# Patient Record
Sex: FEMALE | Race: WHITE | Employment: OTHER | URBAN - METROPOLITAN AREA
[De-identification: names, ages, dates, MRNs, and addresses within clinical notes are randomized per-mention and may not be internally consistent; named-entity substitution may affect disease eponyms.]

---

## 2017-01-01 ENCOUNTER — TELEPHONE (OUTPATIENT)
Dept: FAMILY MEDICINE CLINIC | Facility: CLINIC | Age: 82
End: 2017-01-01

## 2017-01-01 ENCOUNTER — APPOINTMENT (OUTPATIENT)
Dept: ULTRASOUND IMAGING | Facility: HOSPITAL | Age: 82
DRG: 175 | End: 2017-01-01
Attending: INTERNAL MEDICINE
Payer: MEDICARE

## 2017-01-01 ENCOUNTER — PRIOR ORIGINAL RECORDS (OUTPATIENT)
Dept: OTHER | Age: 82
End: 2017-01-01

## 2017-01-01 ENCOUNTER — OFFICE VISIT (OUTPATIENT)
Dept: FAMILY MEDICINE CLINIC | Facility: CLINIC | Age: 82
End: 2017-01-01

## 2017-01-01 ENCOUNTER — LAB ENCOUNTER (OUTPATIENT)
Dept: LAB | Age: 82
End: 2017-01-01
Attending: FAMILY MEDICINE
Payer: COMMERCIAL

## 2017-01-01 ENCOUNTER — APPOINTMENT (OUTPATIENT)
Dept: GENERAL RADIOLOGY | Facility: HOSPITAL | Age: 82
DRG: 175 | End: 2017-01-01
Attending: EMERGENCY MEDICINE
Payer: MEDICARE

## 2017-01-01 ENCOUNTER — HOSPITAL ENCOUNTER (OUTPATIENT)
Dept: GENERAL RADIOLOGY | Age: 82
Discharge: HOME | End: 2017-01-01
Attending: FAMILY MEDICINE
Payer: MEDICARE

## 2017-01-01 ENCOUNTER — APPOINTMENT (OUTPATIENT)
Dept: CV DIAGNOSTICS | Facility: HOSPITAL | Age: 82
DRG: 175 | End: 2017-01-01
Attending: INTERNAL MEDICINE
Payer: MEDICARE

## 2017-01-01 ENCOUNTER — LAB ENCOUNTER (OUTPATIENT)
Dept: LAB | Age: 82
End: 2017-01-01
Attending: FAMILY MEDICINE
Payer: MEDICARE

## 2017-01-01 ENCOUNTER — APPOINTMENT (OUTPATIENT)
Dept: GENERAL RADIOLOGY | Facility: HOSPITAL | Age: 82
DRG: 175 | End: 2017-01-01
Attending: INTERNAL MEDICINE
Payer: MEDICARE

## 2017-01-01 ENCOUNTER — APPOINTMENT (OUTPATIENT)
Dept: CT IMAGING | Facility: HOSPITAL | Age: 82
DRG: 175 | End: 2017-01-01
Attending: EMERGENCY MEDICINE
Payer: MEDICARE

## 2017-01-01 ENCOUNTER — PATIENT OUTREACH (OUTPATIENT)
Dept: CASE MANAGEMENT | Age: 82
End: 2017-01-01

## 2017-01-01 ENCOUNTER — APPOINTMENT (OUTPATIENT)
Dept: CT IMAGING | Facility: HOSPITAL | Age: 82
DRG: 175 | End: 2017-01-01
Attending: INTERNAL MEDICINE
Payer: MEDICARE

## 2017-01-01 ENCOUNTER — HOSPITAL ENCOUNTER (INPATIENT)
Facility: HOSPITAL | Age: 82
LOS: 6 days | Discharge: HOME | DRG: 175 | End: 2017-01-01
Attending: EMERGENCY MEDICINE | Admitting: INTERNAL MEDICINE
Payer: MEDICARE

## 2017-01-01 VITALS
HEART RATE: 64 BPM | HEIGHT: 67 IN | BODY MASS INDEX: 24.17 KG/M2 | TEMPERATURE: 97 F | RESPIRATION RATE: 20 BRPM | WEIGHT: 154 LBS | SYSTOLIC BLOOD PRESSURE: 118 MMHG | DIASTOLIC BLOOD PRESSURE: 68 MMHG

## 2017-01-01 VITALS
DIASTOLIC BLOOD PRESSURE: 84 MMHG | WEIGHT: 162.06 LBS | OXYGEN SATURATION: 95 % | HEIGHT: 68 IN | RESPIRATION RATE: 17 BRPM | SYSTOLIC BLOOD PRESSURE: 115 MMHG | HEART RATE: 120 BPM | BODY MASS INDEX: 24.56 KG/M2 | TEMPERATURE: 98 F

## 2017-01-01 VITALS
OXYGEN SATURATION: 97 % | RESPIRATION RATE: 16 BRPM | SYSTOLIC BLOOD PRESSURE: 98 MMHG | HEART RATE: 98 BPM | DIASTOLIC BLOOD PRESSURE: 64 MMHG

## 2017-01-01 VITALS — OXYGEN SATURATION: 95 % | HEART RATE: 84 BPM | DIASTOLIC BLOOD PRESSURE: 80 MMHG | SYSTOLIC BLOOD PRESSURE: 124 MMHG

## 2017-01-01 DIAGNOSIS — R06.82 TACHYPNEA: ICD-10-CM

## 2017-01-01 DIAGNOSIS — R06.02 SHORTNESS OF BREATH: ICD-10-CM

## 2017-01-01 DIAGNOSIS — I48.92 ATRIAL FIBRILLATION AND FLUTTER (HCC): ICD-10-CM

## 2017-01-01 DIAGNOSIS — N39.0 URINARY TRACT INFECTION, SITE UNSPECIFIED: Primary | ICD-10-CM

## 2017-01-01 DIAGNOSIS — N28.9 RENAL INSUFFICIENCY: ICD-10-CM

## 2017-01-01 DIAGNOSIS — G81.94 HEMIPARESIS, LEFT (HCC): ICD-10-CM

## 2017-01-01 DIAGNOSIS — I48.0 PAROXYSMAL ATRIAL FIBRILLATION (HCC): ICD-10-CM

## 2017-01-01 DIAGNOSIS — I26.99 OTHER PULMONARY EMBOLISM WITHOUT ACUTE COR PULMONALE, UNSPECIFIED CHRONICITY (HCC): ICD-10-CM

## 2017-01-01 DIAGNOSIS — N30.00 ACUTE CYSTITIS WITHOUT HEMATURIA: ICD-10-CM

## 2017-01-01 DIAGNOSIS — I26.99 OTHER ACUTE PULMONARY EMBOLISM WITHOUT ACUTE COR PULMONALE (HCC): Primary | ICD-10-CM

## 2017-01-01 DIAGNOSIS — Z91.81 AT RISK FOR FALLING: ICD-10-CM

## 2017-01-01 DIAGNOSIS — Z86.73 HISTORY OF CVA (CEREBROVASCULAR ACCIDENT): ICD-10-CM

## 2017-01-01 DIAGNOSIS — R47.01 EXPRESSIVE APHASIA: Primary | ICD-10-CM

## 2017-01-01 DIAGNOSIS — R45.1 AGITATION: ICD-10-CM

## 2017-01-01 DIAGNOSIS — I10 ESSENTIAL HYPERTENSION: ICD-10-CM

## 2017-01-01 DIAGNOSIS — Z79.899 MEDICATION MANAGEMENT: ICD-10-CM

## 2017-01-01 DIAGNOSIS — N39.0 URINARY TRACT INFECTION, SITE UNSPECIFIED: ICD-10-CM

## 2017-01-01 DIAGNOSIS — I26.99 OTHER ACUTE PULMONARY EMBOLISM WITHOUT ACUTE COR PULMONALE (HCC): ICD-10-CM

## 2017-01-01 DIAGNOSIS — Z79.899 HIGH RISK MEDICATION USE: Primary | ICD-10-CM

## 2017-01-01 DIAGNOSIS — J90 PLEURAL EFFUSION: Primary | ICD-10-CM

## 2017-01-01 DIAGNOSIS — R05.9 COUGH: Primary | ICD-10-CM

## 2017-01-01 DIAGNOSIS — R47.01 EXPRESSIVE APHASIA: ICD-10-CM

## 2017-01-01 DIAGNOSIS — G93.40 ACUTE ENCEPHALOPATHY: Primary | ICD-10-CM

## 2017-01-01 DIAGNOSIS — F41.1 GAD (GENERALIZED ANXIETY DISORDER): ICD-10-CM

## 2017-01-01 DIAGNOSIS — R05.9 COUGH: ICD-10-CM

## 2017-01-01 DIAGNOSIS — J90 PLEURAL EFFUSION: ICD-10-CM

## 2017-01-01 DIAGNOSIS — I50.9 ACUTE HEART FAILURE, UNSPECIFIED HEART FAILURE TYPE (HCC): ICD-10-CM

## 2017-01-01 DIAGNOSIS — R60.0 BILATERAL LEG EDEMA: ICD-10-CM

## 2017-01-01 DIAGNOSIS — R77.8 ELEVATED TROPONIN: ICD-10-CM

## 2017-01-01 DIAGNOSIS — I48.91 ATRIAL FIBRILLATION AND FLUTTER (HCC): ICD-10-CM

## 2017-01-01 LAB
BILIRUB UR QL STRIP.AUTO: NEGATIVE
BILIRUB UR QL STRIP.AUTO: NEGATIVE
COLOR UR AUTO: YELLOW
COLOR UR AUTO: YELLOW
GLUCOSE UR STRIP.AUTO-MCNC: NEGATIVE MG/DL
GLUCOSE UR STRIP.AUTO-MCNC: NEGATIVE MG/DL
KETONES UR STRIP.AUTO-MCNC: NEGATIVE MG/DL
KETONES UR STRIP.AUTO-MCNC: NEGATIVE MG/DL
NITRITE UR QL STRIP.AUTO: POSITIVE
NITRITE UR QL STRIP.AUTO: POSITIVE
PH UR STRIP.AUTO: 5 [PH] (ref 4.5–8)
PH UR STRIP.AUTO: 6 [PH] (ref 4.5–8)
PROT UR STRIP.AUTO-MCNC: NEGATIVE MG/DL
PROT UR STRIP.AUTO-MCNC: NEGATIVE MG/DL
RBC UR QL AUTO: NEGATIVE
RBC UR QL AUTO: NEGATIVE
SP GR UR STRIP.AUTO: 1.01 (ref 1–1.03)
SP GR UR STRIP.AUTO: 1.01 (ref 1–1.03)
UROBILINOGEN UR STRIP.AUTO-MCNC: <2 MG/DL
UROBILINOGEN UR STRIP.AUTO-MCNC: <2 MG/DL

## 2017-01-01 PROCEDURE — 99233 SBSQ HOSP IP/OBS HIGH 50: CPT | Performed by: HOSPITALIST

## 2017-01-01 PROCEDURE — 99239 HOSP IP/OBS DSCHRG MGMT >30: CPT | Performed by: HOSPITALIST

## 2017-01-01 PROCEDURE — 99232 SBSQ HOSP IP/OBS MODERATE 35: CPT | Performed by: INTERNAL MEDICINE

## 2017-01-01 PROCEDURE — 81001 URINALYSIS AUTO W/SCOPE: CPT

## 2017-01-01 PROCEDURE — 71275 CT ANGIOGRAPHY CHEST: CPT

## 2017-01-01 PROCEDURE — 71010 XR CHEST AP PORTABLE  (CPT=71010): CPT

## 2017-01-01 PROCEDURE — 93306 TTE W/DOPPLER COMPLETE: CPT | Performed by: INTERNAL MEDICINE

## 2017-01-01 PROCEDURE — 99223 1ST HOSP IP/OBS HIGH 75: CPT | Performed by: INTERNAL MEDICINE

## 2017-01-01 PROCEDURE — 70498 CT ANGIOGRAPHY NECK: CPT | Performed by: RADIOLOGY

## 2017-01-01 PROCEDURE — 99221 1ST HOSP IP/OBS SF/LOW 40: CPT | Performed by: NURSE PRACTITIONER

## 2017-01-01 PROCEDURE — 71020 XR CHEST PA + LAT CHEST (CPT=71020): CPT

## 2017-01-01 PROCEDURE — 87077 CULTURE AEROBIC IDENTIFY: CPT

## 2017-01-01 PROCEDURE — 87186 SC STD MICRODIL/AGAR DIL: CPT

## 2017-01-01 PROCEDURE — 99214 OFFICE O/P EST MOD 30 MIN: CPT | Performed by: FAMILY MEDICINE

## 2017-01-01 PROCEDURE — 87086 URINE CULTURE/COLONY COUNT: CPT

## 2017-01-01 PROCEDURE — 95819 EEG AWAKE AND ASLEEP: CPT | Performed by: OTHER

## 2017-01-01 PROCEDURE — 99232 SBSQ HOSP IP/OBS MODERATE 35: CPT | Performed by: NURSE PRACTITIONER

## 2017-01-01 PROCEDURE — 99497 ADVNCD CARE PLAN 30 MIN: CPT | Performed by: NURSE PRACTITIONER

## 2017-01-01 PROCEDURE — 93970 EXTREMITY STUDY: CPT

## 2017-01-01 PROCEDURE — 70496 CT ANGIOGRAPHY HEAD: CPT | Performed by: RADIOLOGY

## 2017-01-01 PROCEDURE — 93306 TTE W/DOPPLER COMPLETE: CPT

## 2017-01-01 PROCEDURE — 99291 CRITICAL CARE FIRST HOUR: CPT | Performed by: OTHER

## 2017-01-01 RX ORDER — POTASSIUM CHLORIDE 20 MEQ/1
40 TABLET, EXTENDED RELEASE ORAL EVERY 4 HOURS
Status: COMPLETED | OUTPATIENT
Start: 2017-01-01 | End: 2017-01-01

## 2017-01-01 RX ORDER — FUROSEMIDE 10 MG/ML
40 INJECTION INTRAMUSCULAR; INTRAVENOUS
Status: DISCONTINUED | OUTPATIENT
Start: 2017-01-01 | End: 2017-01-01

## 2017-01-01 RX ORDER — FUROSEMIDE 40 MG/1
40 TABLET ORAL DAILY
Qty: 90 TABLET | Refills: 1 | Status: SHIPPED | OUTPATIENT
Start: 2017-01-01

## 2017-01-01 RX ORDER — SODIUM CHLORIDE 9 MG/ML
INJECTION, SOLUTION INTRAVENOUS CONTINUOUS
Status: DISCONTINUED | OUTPATIENT
Start: 2017-01-01 | End: 2017-01-01

## 2017-01-01 RX ORDER — POTASSIUM CHLORIDE 14.9 MG/ML
20 INJECTION INTRAVENOUS ONCE
Status: COMPLETED | OUTPATIENT
Start: 2017-01-01 | End: 2017-01-01

## 2017-01-01 RX ORDER — MIRTAZAPINE 15 MG/1
15 TABLET, FILM COATED ORAL NIGHTLY
COMMUNITY

## 2017-01-01 RX ORDER — HALOPERIDOL 5 MG/ML
1 INJECTION INTRAMUSCULAR EVERY 6 HOURS PRN
Status: DISCONTINUED | OUTPATIENT
Start: 2017-01-01 | End: 2017-01-01

## 2017-01-01 RX ORDER — LORAZEPAM 0.5 MG/1
TABLET ORAL
Qty: 30 TABLET | Refills: 0 | Status: SHIPPED | OUTPATIENT
Start: 2017-01-01 | End: 2017-01-01

## 2017-01-01 RX ORDER — ATORVASTATIN CALCIUM 40 MG/1
40 TABLET, FILM COATED ORAL NIGHTLY
COMMUNITY

## 2017-01-01 RX ORDER — LORAZEPAM 0.5 MG/1
0.5 TABLET ORAL 2 TIMES DAILY
COMMUNITY

## 2017-01-01 RX ORDER — LEVOTHYROXINE SODIUM 0.1 MG/1
100 TABLET ORAL
COMMUNITY

## 2017-01-01 RX ORDER — LORAZEPAM 0.5 MG/1
0.5 TABLET ORAL 2 TIMES DAILY
Status: DISCONTINUED | OUTPATIENT
Start: 2017-01-01 | End: 2017-01-01

## 2017-01-01 RX ORDER — FUROSEMIDE 10 MG/ML
40 INJECTION INTRAMUSCULAR; INTRAVENOUS 3 TIMES DAILY
Status: DISCONTINUED | OUTPATIENT
Start: 2017-01-01 | End: 2017-01-01

## 2017-01-01 RX ORDER — CEPHALEXIN 250 MG/1
250 CAPSULE ORAL 3 TIMES DAILY
Qty: 21 CAPSULE | Refills: 0 | Status: SHIPPED | OUTPATIENT
Start: 2017-01-01 | End: 2017-01-01 | Stop reason: ALTCHOICE

## 2017-01-01 RX ORDER — ASPIRIN 300 MG
300 SUPPOSITORY, RECTAL RECTAL DAILY
Status: DISCONTINUED | OUTPATIENT
Start: 2017-01-01 | End: 2017-01-01

## 2017-01-01 RX ORDER — LEVOTHYROXINE SODIUM 0.1 MG/1
TABLET ORAL
Qty: 7 TABLET | Refills: 0 | Status: SHIPPED | OUTPATIENT
Start: 2017-01-01 | End: 2017-01-01

## 2017-01-01 RX ORDER — SODIUM PHOSPHATE, DIBASIC AND SODIUM PHOSPHATE, MONOBASIC 7; 19 G/133ML; G/133ML
1 ENEMA RECTAL ONCE AS NEEDED
Status: ACTIVE | OUTPATIENT
Start: 2017-01-01 | End: 2017-01-01

## 2017-01-01 RX ORDER — OMEPRAZOLE 20 MG/1
CAPSULE, DELAYED RELEASE ORAL
Qty: 90 CAPSULE | Refills: 0 | Status: SHIPPED | OUTPATIENT
Start: 2017-01-01 | End: 2017-01-01

## 2017-01-01 RX ORDER — FLUOXETINE HYDROCHLORIDE 20 MG/1
CAPSULE ORAL
Qty: 90 CAPSULE | Refills: 1 | Status: SHIPPED | OUTPATIENT
Start: 2017-01-01 | End: 2017-01-01

## 2017-01-01 RX ORDER — LOSARTAN POTASSIUM 100 MG/1
100 TABLET ORAL DAILY
Status: DISCONTINUED | OUTPATIENT
Start: 2017-01-01 | End: 2017-01-01

## 2017-01-01 RX ORDER — FUROSEMIDE 10 MG/ML
40 INJECTION INTRAMUSCULAR; INTRAVENOUS DAILY
Status: COMPLETED | OUTPATIENT
Start: 2017-01-01 | End: 2017-01-01

## 2017-01-01 RX ORDER — HEPARIN SODIUM 5000 [USP'U]/ML
80 INJECTION INTRAVENOUS; SUBCUTANEOUS ONCE
Status: COMPLETED | OUTPATIENT
Start: 2017-01-01 | End: 2017-01-01

## 2017-01-01 RX ORDER — LORAZEPAM 0.5 MG/1
TABLET ORAL
Qty: 90 TABLET | Refills: 0 | OUTPATIENT
Start: 2017-01-01

## 2017-01-01 RX ORDER — WARFARIN SODIUM 2 MG/1
4 TABLET ORAL NIGHTLY
Status: DISCONTINUED | OUTPATIENT
Start: 2017-01-01 | End: 2017-01-01

## 2017-01-01 RX ORDER — METOPROLOL TARTRATE 5 MG/5ML
5 INJECTION INTRAVENOUS EVERY 6 HOURS PRN
Status: DISCONTINUED | OUTPATIENT
Start: 2017-01-01 | End: 2017-01-01

## 2017-01-01 RX ORDER — LEVOTHYROXINE SODIUM 0.1 MG/1
TABLET ORAL
Qty: 90 TABLET | Refills: 1 | Status: SHIPPED | OUTPATIENT
Start: 2017-01-01 | End: 2017-01-01

## 2017-01-01 RX ORDER — DILTIAZEM HYDROCHLORIDE 5 MG/ML
10 INJECTION INTRAVENOUS EVERY 2 HOUR PRN
Status: ACTIVE | OUTPATIENT
Start: 2017-01-01 | End: 2017-01-01

## 2017-01-01 RX ORDER — OLMESARTAN MEDOXOMIL AND HYDROCHLOROTHIAZIDE 40/25 40; 25 MG/1; MG/1
TABLET ORAL
Qty: 90 TABLET | Refills: 0 | OUTPATIENT
Start: 2017-01-01

## 2017-01-01 RX ORDER — FLUOXETINE HYDROCHLORIDE 20 MG/1
CAPSULE ORAL
Qty: 90 CAPSULE | Refills: 0 | Status: SHIPPED | OUTPATIENT
Start: 2017-01-01 | End: 2017-01-01

## 2017-01-01 RX ORDER — FUROSEMIDE 10 MG/ML
60 INJECTION INTRAMUSCULAR; INTRAVENOUS ONCE
Status: COMPLETED | OUTPATIENT
Start: 2017-01-01 | End: 2017-01-01

## 2017-01-01 RX ORDER — ASPIRIN 81 MG/1
81 TABLET ORAL DAILY
Qty: 30 TABLET | Refills: 0 | Status: SHIPPED | COMMUNITY
Start: 2017-01-01

## 2017-01-01 RX ORDER — LORAZEPAM 2 MG/ML
1 INJECTION INTRAMUSCULAR EVERY 10 MIN PRN
Status: DISCONTINUED | OUTPATIENT
Start: 2017-01-01 | End: 2017-01-01

## 2017-01-01 RX ORDER — ERGOCALCIFEROL 1.25 MG/1
CAPSULE ORAL
Qty: 12 CAPSULE | Refills: 0 | Status: SHIPPED | OUTPATIENT
Start: 2017-01-01

## 2017-01-01 RX ORDER — LORAZEPAM 2 MG/ML
2 INJECTION INTRAMUSCULAR ONCE
Status: DISCONTINUED | OUTPATIENT
Start: 2017-01-01 | End: 2017-01-01

## 2017-01-01 RX ORDER — LORAZEPAM 2 MG/ML
INJECTION INTRAMUSCULAR
Status: DISCONTINUED
Start: 2017-01-01 | End: 2017-01-01 | Stop reason: WASHOUT

## 2017-01-01 RX ORDER — DEXTROSE MONOHYDRATE 50 MG/ML
INJECTION, SOLUTION INTRAVENOUS CONTINUOUS
Status: DISCONTINUED | OUTPATIENT
Start: 2017-01-01 | End: 2017-01-01

## 2017-01-01 RX ORDER — LEVETIRACETAM 500 MG/1
500 TABLET ORAL 2 TIMES DAILY
Status: DISCONTINUED | OUTPATIENT
Start: 2017-01-01 | End: 2017-01-01

## 2017-01-01 RX ORDER — FUROSEMIDE 40 MG/1
40 TABLET ORAL DAILY
Status: DISCONTINUED | OUTPATIENT
Start: 2017-01-01 | End: 2017-01-01

## 2017-01-01 RX ORDER — ASPIRIN 325 MG
325 TABLET, DELAYED RELEASE (ENTERIC COATED) ORAL DAILY
Status: DISCONTINUED | OUTPATIENT
Start: 2017-01-01 | End: 2017-01-01

## 2017-01-01 RX ORDER — POTASSIUM CHLORIDE 20 MEQ/1
TABLET, EXTENDED RELEASE ORAL
Qty: 180 TABLET | Refills: 1 | Status: SHIPPED | OUTPATIENT
Start: 2017-01-01 | End: 2017-01-01

## 2017-01-01 RX ORDER — HEPARIN SODIUM AND DEXTROSE 10000; 5 [USP'U]/100ML; G/100ML
INJECTION INTRAVENOUS CONTINUOUS
Status: DISCONTINUED | OUTPATIENT
Start: 2017-01-01 | End: 2017-01-01

## 2017-01-01 RX ORDER — MIRTAZAPINE 15 MG/1
15 TABLET, FILM COATED ORAL NIGHTLY
Status: DISCONTINUED | OUTPATIENT
Start: 2017-01-01 | End: 2017-01-01

## 2017-01-01 RX ORDER — FLUOXETINE HYDROCHLORIDE 20 MG/1
20 CAPSULE ORAL DAILY
Status: DISCONTINUED | OUTPATIENT
Start: 2017-01-01 | End: 2017-01-01

## 2017-01-01 RX ORDER — OMEPRAZOLE 20 MG/1
CAPSULE, DELAYED RELEASE ORAL
Qty: 90 CAPSULE | Refills: 0 | Status: SHIPPED | OUTPATIENT
Start: 2017-01-01

## 2017-01-01 RX ORDER — ACETAMINOPHEN 325 MG/1
650 TABLET ORAL EVERY 6 HOURS PRN
Status: DISCONTINUED | OUTPATIENT
Start: 2017-01-01 | End: 2017-01-01

## 2017-01-01 RX ORDER — LEVOTHYROXINE SODIUM 0.1 MG/1
100 TABLET ORAL
Status: DISCONTINUED | OUTPATIENT
Start: 2017-01-01 | End: 2017-01-01

## 2017-01-01 RX ORDER — LEVETIRACETAM 500 MG/1
500 TABLET ORAL 2 TIMES DAILY
Qty: 60 TABLET | Refills: 0 | Status: SHIPPED | OUTPATIENT
Start: 2017-01-01

## 2017-01-01 RX ORDER — PANTOPRAZOLE SODIUM 20 MG/1
20 TABLET, DELAYED RELEASE ORAL
Status: DISCONTINUED | OUTPATIENT
Start: 2017-01-01 | End: 2017-01-01

## 2017-01-01 RX ORDER — DEXTROSE AND SODIUM CHLORIDE 5; .45 G/100ML; G/100ML
INJECTION, SOLUTION INTRAVENOUS CONTINUOUS
Status: DISCONTINUED | OUTPATIENT
Start: 2017-01-01 | End: 2017-01-01

## 2017-01-01 RX ORDER — ASPIRIN 325 MG
325 TABLET ORAL DAILY
Status: DISCONTINUED | OUTPATIENT
Start: 2017-01-01 | End: 2017-01-01

## 2017-01-01 RX ORDER — ERGOCALCIFEROL (VITAMIN D2) 1250 MCG
50000 CAPSULE ORAL WEEKLY
Qty: 12 CAPSULE | Refills: 0 | Status: ON HOLD | OUTPATIENT
Start: 2017-01-01 | End: 2017-01-01

## 2017-01-01 RX ORDER — WARFARIN SODIUM 3 MG/1
3 TABLET ORAL NIGHTLY
Qty: 30 TABLET | Refills: 3 | Status: SHIPPED | OUTPATIENT
Start: 2017-01-01

## 2017-01-01 RX ORDER — ATORVASTATIN CALCIUM 40 MG/1
40 TABLET, FILM COATED ORAL NIGHTLY
Status: DISCONTINUED | OUTPATIENT
Start: 2017-01-01 | End: 2017-01-01

## 2017-01-01 RX ORDER — LORAZEPAM 0.5 MG/1
0.5 TABLET ORAL 3 TIMES DAILY PRN
Qty: 90 TABLET | Refills: 0 | Status: SHIPPED | OUTPATIENT
Start: 2017-01-01 | End: 2017-01-01

## 2017-01-01 RX ORDER — FUROSEMIDE 40 MG/1
TABLET ORAL
COMMUNITY
Start: 2017-01-01 | End: 2017-01-01

## 2017-01-01 RX ORDER — FUROSEMIDE 40 MG/1
40 TABLET ORAL DAILY
COMMUNITY
End: 2017-01-01

## 2017-01-01 RX ORDER — NITROFURANTOIN 25; 75 MG/1; MG/1
100 CAPSULE ORAL 2 TIMES DAILY
Qty: 14 CAPSULE | Refills: 0 | Status: SHIPPED | OUTPATIENT
Start: 2017-01-01 | End: 2017-01-01

## 2017-01-01 RX ORDER — MIRTAZAPINE 15 MG/1
TABLET, FILM COATED ORAL
Qty: 7 TABLET | Refills: 0 | Status: SHIPPED | OUTPATIENT
Start: 2017-01-01 | End: 2017-01-01

## 2017-01-01 RX ORDER — DONEPEZIL HYDROCHLORIDE 10 MG/1
TABLET, FILM COATED ORAL
Qty: 90 TABLET | Refills: 1 | Status: SHIPPED | OUTPATIENT
Start: 2017-01-01 | End: 2017-01-01

## 2017-01-01 RX ORDER — MIRTAZAPINE 15 MG/1
TABLET, FILM COATED ORAL
Qty: 90 TABLET | Refills: 1 | Status: SHIPPED | OUTPATIENT
Start: 2017-01-01 | End: 2017-01-01

## 2017-01-01 RX ORDER — HEPARIN SODIUM AND DEXTROSE 10000; 5 [USP'U]/100ML; G/100ML
18 INJECTION INTRAVENOUS ONCE
Status: COMPLETED | OUTPATIENT
Start: 2017-01-01 | End: 2017-01-01

## 2017-01-01 RX ORDER — HYDRALAZINE HYDROCHLORIDE 20 MG/ML
10 INJECTION INTRAMUSCULAR; INTRAVENOUS EVERY 4 HOURS PRN
Status: DISCONTINUED | OUTPATIENT
Start: 2017-01-01 | End: 2017-01-01

## 2017-01-01 RX ORDER — ATORVASTATIN CALCIUM 40 MG/1
TABLET, FILM COATED ORAL
Qty: 90 TABLET | Refills: 1 | Status: SHIPPED | OUTPATIENT
Start: 2017-01-01 | End: 2017-01-01

## 2017-01-01 RX ORDER — ONDANSETRON 2 MG/ML
4 INJECTION INTRAMUSCULAR; INTRAVENOUS EVERY 6 HOURS PRN
Status: DISCONTINUED | OUTPATIENT
Start: 2017-01-01 | End: 2017-01-01

## 2017-01-01 RX ORDER — POLYETHYLENE GLYCOL 3350 17 G/17G
17 POWDER, FOR SOLUTION ORAL DAILY PRN
Status: DISCONTINUED | OUTPATIENT
Start: 2017-01-01 | End: 2017-01-01

## 2017-01-01 RX ORDER — FUROSEMIDE 40 MG/1
40 TABLET ORAL DAILY
Qty: 30 TABLET | Refills: 2 | Status: SHIPPED | OUTPATIENT
Start: 2017-01-01 | End: 2017-01-01

## 2017-01-03 NOTE — TELEPHONE ENCOUNTER
Amy Mata would like to extend ST two more visits. One more this , week and one next. She just needs a verbal ok. Please advise.

## 2017-01-03 NOTE — TELEPHONE ENCOUNTER
Call to [facility]idential home health/speech therapy-advised of dr burns's orders noted below. Samuel Mancera voices understanding/sts will update again if further visits indicated.

## 2017-02-02 NOTE — TELEPHONE ENCOUNTER
Received paper from [facility] stating that pt has V-Tach and they were checking if okay to continue taking Donepezil. Per Dr Sascha Freire called to Dr Layo Singh' office to be advised if okay.   Spoke with Geraldine Fair who took my informa

## 2017-02-03 NOTE — TELEPHONE ENCOUNTER
Spoke with Mitali Elder at Dr Alberto Carrero office who stated that the pt got this from her neurologist for memory. They did not prescribe it and will not comment on stopping or not.     Called to pt per Dr Nael Mari and advised her to stop the Donepezil due to possib

## 2017-02-03 NOTE — TELEPHONE ENCOUNTER
RN returned call and stated that the medication was not prescribed by cardiology, that Dr Ace Mclean gave a consult at ER

## 2017-02-14 NOTE — TELEPHONE ENCOUNTER
Pt.s spouse Justine Tian called ( he is on her HIPPA) her Donepezil was stopped 02/02/2017. He feels like since last week she has become more agitated , sleeps more and has had more crying episodes.  She is still taking all of her other meds for depression and a

## 2017-02-15 NOTE — TELEPHONE ENCOUNTER
Increase agitation is possibly from stopping the donezepil. Have the  increase the patient's clonazepam dose to a full tablet of 0.5 mg twice daily to see if this helps. Have him update me in a week.

## 2017-02-20 NOTE — TELEPHONE ENCOUNTER
Spoke to [facility] ()-he voiced that he didn't think she needed full tablet of Clonazepam. Had been taking the full tablet until now for the past week.  Her agitation seems to increase at bedtime, ane I said maybe because it's wearing off-he wants to go ba

## 2017-02-24 NOTE — TELEPHONE ENCOUNTER
Okay to maintain at same dose of lorazepam.  The  could see if she could tolerate half a tablet 3 times a day as opposed to twice a day if he needs to for agitation

## 2017-02-24 NOTE — TELEPHONE ENCOUNTER
CB from , information and recommendations given, understanding verbalized. Prescription pended to Dr. Sascha Freire.

## 2017-02-24 NOTE — TELEPHONE ENCOUNTER
Tristin Gibbs called back to update on wife taking lorazepam 0.5mg. Reports she has been taking 1/2 tab bid, feels she is more calm at this dose but does not feel any change in her agitation. Nothing is worse. He asks for refill if we keep same dose.  I stated I wo

## 2017-02-27 NOTE — TELEPHONE ENCOUNTER
Pt.s spouse Rodrigue Robison( he is on pt.s HIPPA) called with dosing questions on pt.s Lorazepam. Pt was prescribed Lorazepam 0.5 mg 1 tab tid prn for agitation/ anxiety. Rodrigue Robison had been giving her 1/2 of a 0.5 mg tab 2 times daily.  Dr. Harding Artist advised him to i

## 2017-03-02 NOTE — PROGRESS NOTES
Kianna Chavira is a 80year old female. HPI:   Patient presented today with her  and caregiver. Caregiver reporting noticing the patient has been more agitated over the past few days. She has been giving the patient 0.5 mg Lorazepam [id number].  Ca Glucosamine Sulfate 1000 MG Oral Cap Take 1,000 mg by mouth 2 (two) times daily. Disp:  Rfl:    Alum & Mag Hydroxide-Simeth 211-260-01 MG/5ML Oral Suspension Take by mouth 4 (four) times daily as needed for Indigestion.  Disp:  Rfl:       Past Medical His Refills for this Visit:  No prescriptions requested or ordered in this encounter    Imaging & Consults:  None    Follow Up with:  No follow-up provider specified. 1. Cough  Will obtain CXR. - XR CHEST PA + LAT CHEST (CPT=71020); Future    2.  Tachypnea

## 2017-03-07 NOTE — TELEPHONE ENCOUNTER
Called out to pt spoke with , she is alert, appetite ok    Reported Aylin Hurley is fairly well\" \"not too bad\"   \"Though at night time, she sometimes sounds wheezy\"  Advised to elevate back with pillows if tolerated or turn to sides   Alert, appetite

## 2017-03-08 NOTE — TELEPHONE ENCOUNTER
[facility] called looking for abx for pt. Pt  is there, I have sent for them since they are waiting for it.

## 2017-03-13 NOTE — TELEPHONE ENCOUNTER
call from ray/spouse-listed on hipaa consent-stating pt \"has been taking macrobid since 3/8/17 for uti but past few days has been more tired, no appetite and has had GI upset/vomited couple times.   Think it may be the medicine because she has no fever, no

## 2017-03-13 NOTE — TELEPHONE ENCOUNTER
Call back to ray/spouse-advised of dr burns's recommendations. Reinforced repeat urine sample again in 2 weeks-explained rationale. Reinforced if symptoms noted past few days worsen or do not go away, to let us know.   Kaveh voices understanding/agrees

## 2017-03-20 NOTE — TELEPHONE ENCOUNTER
Call from ray/spouse reporting marlena \"has been more tired over past couple wks since being on lorazepam 3x/day. Confirms pt is taking one half of 0.5mg tab 3x/day now-was previously on one half tablet twice a day and was not as tired.    Record shows l

## 2017-03-21 NOTE — TELEPHONE ENCOUNTER
Call from ray/spouse-danielle if dr Dmitri Edwards would consider giving an order for a physical therapy evaluation. sts pt's caregiver suggested since pt has been more tired and not as strong lately. sts she suggested [facility].   Susan Bowling he contacted yamini at [facility]

## 2017-03-21 NOTE — TELEPHONE ENCOUNTER
Received fax from [facility] for a 1 week supply refill for Mirtazapine  15 mg 1 nightly and levothyroxine 100 mcg 1 nightly. Pt is waiting for mail order.

## 2017-03-23 NOTE — TELEPHONE ENCOUNTER
Meme Jacques from [facility] is asking for pt's progress notes from [address] w/Dr. Bah Screws of 3/2 and med list be faxed to Meme Jacques at [facility] at [phone].

## 2017-03-28 NOTE — TELEPHONE ENCOUNTER
Lacie Scheuermann called to update Dr. Davi Newberry that pt was seen for Home Health Evaluation. Pt will come for Physical Therapy 2 x week for 5 weeks.  Call Lacie Scheuermann if you feel the need. [phone]

## 2017-03-28 NOTE — TELEPHONE ENCOUNTER
Ignacia Camacho is requesting an order for evaluation and treatment for occupational therapy for pt.

## 2017-03-30 NOTE — TELEPHONE ENCOUNTER
Pt's  called and asking for lab order for pt. Pt's  is requesting if we can give the order to her RN so he can draw it at their house. To call Sandy Werner RN from [facility] and give verbal order for labs at [phone].

## 2017-03-31 NOTE — TELEPHONE ENCOUNTER
Call from yamini/SHANIQUE Graham-confirms received order from dr burns to do BMP, but sts messg did not indicate when BMP needed to be done.    Advised yamini of dr Jagdish Canas recommendation and rationale noted in 3/28/17 cxr result notes, which indicate BMP w

## 2017-04-02 NOTE — PROGRESS NOTES
Emeterio Schilling is a 80year old female. HPI:   Patient is here for follow-up. The  states that she does seem more tired recently. Her carekeeper states that she has seemed more short of breath.   She also believes she has been a little bit more breakfast. Disp:  Rfl:    aspirin 325 MG Oral Tab Take 1 tablet by mouth daily. Disp: 90 tablet Rfl: 3   Glucosamine Sulfate 1000 MG Oral Cap Take 1,000 mg by mouth 2 (two) times daily.  Disp:  Rfl:    cephALEXin (KEFLEX) 250 MG Oral Cap Take 1 capsule (250 1. Pleural effusion  - XR CHEST PA + LAT CHEST (CPT=71020); Future  Continue furosemide 40 mg 1 tablet daily-may adjust depending on x-ray results    2.  Essential hypertension  Continue losartan 100 mg daily  Continue metoprolol tartrate 25 mg half a tab

## 2017-04-04 NOTE — TELEPHONE ENCOUNTER
Received form from [facility] requesting pt information be filled out and faxed back. Form started pt will need appointment to complete form. Pt scheduled 8/14/17, not sure when paper is due back. Form at Dr Lisa Torres desk.

## 2017-04-05 NOTE — TELEPHONE ENCOUNTER
Call from estiven/therapy coordinator/[facility]-sts \"i can see that your office is trying to fax something on marlena rai but it is not coming through.  Want to provide alternate fax # [phone]  Asked estiven when this fax was trying to come through, she s

## 2017-04-06 NOTE — TELEPHONE ENCOUNTER
I have located original fax in [facility] faxed papers folder. I have re-faxed to # below. Place back in [place] folder.

## 2017-04-11 NOTE — TELEPHONE ENCOUNTER
Rashad Arzola faxed over lab results today for pt from [facility]. Calling to see if they were received; there were some high values on some of the labs.

## 2017-04-12 NOTE — TELEPHONE ENCOUNTER
The labs showed she had a slightly elevated sodium of 148 but his her potassium is normal at 5.1. Continue same medication regimen and repeat a CMP in 3-4 weeks.

## 2017-04-12 NOTE — TELEPHONE ENCOUNTER
Spoke to Gen at [facility], he is aware to have patient continue same Rx and do CMP in 3-4 weeks. He will call patient's  Shana Butler too.

## 2017-04-24 PROBLEM — R60.0 BILATERAL LEG EDEMA: Status: ACTIVE | Noted: 2017-01-01

## 2017-04-26 NOTE — PROGRESS NOTES
Assisted care physical exam    Patient is an 80-year-old female who will be admitted to [address] [place] [address] in the near future. She is here for a pre-admittance medical clearance.     /68 mmHg  Pulse 64  Temp(Src) 97.4 °F (36.3 °C)  Resp 20  Ht 6

## 2017-04-28 PROBLEM — N30.00 ACUTE CYSTITIS WITHOUT HEMATURIA: Status: ACTIVE | Noted: 2017-01-01

## 2017-04-28 PROBLEM — R06.02 SHORTNESS OF BREATH: Status: ACTIVE | Noted: 2017-01-01

## 2017-04-28 PROBLEM — N28.9 RENAL INSUFFICIENCY: Status: ACTIVE | Noted: 2017-01-01

## 2017-04-28 PROBLEM — I26.99 OTHER ACUTE PULMONARY EMBOLISM WITHOUT ACUTE COR PULMONALE (HCC): Status: ACTIVE | Noted: 2017-01-01

## 2017-04-28 PROBLEM — R77.8 ELEVATED TROPONIN: Status: ACTIVE | Noted: 2017-01-01

## 2017-04-28 PROBLEM — I26.99 PULMONARY EMBOLISM WITHOUT ACUTE COR PULMONALE (HCC): Status: ACTIVE | Noted: 2017-01-01

## 2017-04-28 NOTE — TELEPHONE ENCOUNTER
Per chart pt is in ED right now. Will check with pt on Monday on needing this. I do not see us ever prescribing this rx for pt per her chart.

## 2017-04-28 NOTE — ED INITIAL ASSESSMENT (HPI)
Per medics pt has been grunting when breathing for past couple of days. 92% on room air when medics arrived with a wheeze noted. Pt was given nebulizer by medics and o2 sat up to 99% on room air.

## 2017-04-28 NOTE — TELEPHONE ENCOUNTER
Call to nic ARENAS/jaclyn RN-provided all info noted below including dr burns's recommendation for ER eval/blood gases, etc as indicated. jaclyn voices understanding, no questions.

## 2017-04-28 NOTE — ED NOTES
Bedside report given to [facility]. Ralph catheter placed as ordered by MD, small amount of cloudy yellow urine returned. Pt's spouse at bedside. No questions or needs at this time.

## 2017-04-28 NOTE — CONSULTS
[facility]    Report of Consultation    Sandra Forrester Patient Status:  Emergency    1927 MRN [id number]   Location [address] Attending Brandon Mendoza MD   Hosp Day # 0 PCP Laurence Larose MD     Date of Adm about 53 years ago. She has never used smokeless tobacco. She reports that she does not drink alcohol or use illicit drugs. Allergies:     Avelox [Moxifloxaci*        Comment:Tremor  Bactrim                     Comment:Goofy    Medications:    Current fa Essential hypertension     SYBIL (generalized anxiety disorder)     Hemiparesis, left (HCC)     Expressive aphasia     History of CVA (cerebrovascular accident)     Acute heart failure ([facility] [address]     Acute heart failure, unspecified heart failure type ([address]     A

## 2017-04-28 NOTE — ED PROVIDER NOTES
Patient Seen in: [facility] Emergency Department    History   Patient presents with:  Dyspnea CARMEN SOB (respiratory)    Stated Complaint: CARMEN    HPI    History is obtained from patient's .   Patient has a history of expressive aphasia related t 2 (two) times daily. ergocalciferol 11639 units Oral Cap,  Take 1 capsule (50,000 Units total) by mouth once a week. Every Friday.    ATORVASTATIN CALCIUM 40 MG Oral Tab,  TAKE 1 TABLET NIGHTLY   metoprolol Tartrate 25 MG Oral Tab,  Take 0.5 tablets (12 kg  BMI 24.33 kg/m2  SpO2 97%        Physical Exam  General: The patient is awake and will follow some simple commands such as opening her eyes and mouth. She appears tachypneic and slightly labored. She is afebrile.   Room air pulse oximetry is normal. Bacteria Urine 1+ (*)     Mucous Urine 1+ (*)     WBC Clump Present (*)     Non-Squamous Epithelial Moderate (*)     All other components within normal limits   D-DIMER - Abnormal; Notable for the following:     D-Dimer 2.08 (*)     All other components wi BLUE   RAINBOW DRAW GOLD   RAINBOW DRAW LAVENDER   RAINBOW DRAW LIGHT GREEN   RESPIRATORY PANEL FLU EXPANDED   URINE CULTURE, ROUTINE      EKG    Rate, intervals and axes as noted on EKG Report.   Rate: 10 1 bpm  Rhythm: Atrial flutter with variable block heparin. I discussed this with the .   He is also in agreement with the plan    Disposition and Plan     Clinical Impression:  Other acute pulmonary embolism without acute cor pulmonale ([facility] [address]  (primary encounter diagnosis)  Shortness of breath  Elev

## 2017-04-28 NOTE — PROGRESS NOTES
Called by Dr. Sachin Centeno and informed CT positive for sub-massive PE. Given overall clinical picture Kenji Acuna is not an appropriate candidate for thrombolytic therapy (systemic or catheter directed). Will initiate heparin and plan to convert to coumadin.  May g

## 2017-04-28 NOTE — ED NOTES
When pt arrived o2 sat 99% on room, lung sound diminished, wheeze heard. RN called RT. Pt appears to be working hard to breath, occasional grunting heard while breathing. 2L O2 per NC applied.

## 2017-04-28 NOTE — TELEPHONE ENCOUNTER
Pt  called stating pt needed a refill of Hydrocodone- Apap 5-325 tab.  stated the bottle he had was dated back in 2015. Either pt or  Nacho Rangel will . Please advise.

## 2017-04-28 NOTE — TELEPHONE ENCOUNTER
Call from ray/spouse-sts pt was in to see dr Humphrey Robbins 4/24/17, home health nurse has been there 4/25 and 4/27. Reports pt now has a \"wheeze-wondering why. It is hard for us to get her to take her pills. She holds them in the side of her mouth.  It takes a

## 2017-04-29 NOTE — PLAN OF CARE
Pt care assumed this am, more alert, swallow evaluated by speech, diet advanced to cardiac ground/chopped with nectar thick liquids. Able to give pt her medications, HR decreased and controlled afib. Heparin gtt adjusted per protocol based on ptt values.  D

## 2017-04-29 NOTE — PROGRESS NOTES
[facility]  Progress Note     Aaron Patient Patient Status:  Inpatient    1927 MRN [id number]   [facility] 6NE-A Attending Alessio Reyes MD   Hosp Day # 1 PCP Shahla Contreras MD     Chief Complaint: PE    S: Patient ove Oral Nightly   • Pantoprazole Sodium  20 mg Oral QAM AC   • Levothyroxine Sodium  100 mcg Oral Before breakfast   • LORazepam  0.5 mg Oral BID   • atorvastatin  40 mg Oral Nightly   • FLUoxetine HCl  20 mg Oral Daily   • mirtazapine  15 mg Oral Nightly

## 2017-04-29 NOTE — HISTORICAL OFFICE NOTE
Maxx Capellan  : 1927  ACCOUNT:  [id number]  [phone]  PCP: Dr. Enrrique Garcia     TODAY'S DATE: 2017  DICTATED BY:  Africa Aguilar M.D. ]    CHIEF COMPLAINT: [Followup of Chronic atrial fibrillation.]    HPI:    [On 2017, [facility]. MARITAL STATUS:  and mother of 1. OCCUPATION: retired and .      ALLERGIES: No Known Allergies    MEDICATIONS: Selected prescriptions see below    VITAL SIGNS: [B/P - 120/80 , Pulse - 68, Weight -  160, Height -   64 , BMI - 27.5 ] [id number] C: Dr. Sandra Kohler

## 2017-04-29 NOTE — H&P
[facility]  History and Physical     Miguel Brinda Forrester Patient Status:  Inpatient    1927 MRN [id number]   [facility] 6NE-A Attending Haroon Yung MD   Hosp Day # 1 PCP Miles Nguyen MD     Chief Complaint: Reji Ringer reports that she quit smoking about 53 years ago. She has never used smokeless tobacco. She reports that she does not drink alcohol or use illicit drugs.     Family History:   Family History   Problem Relation Age of Onset   • Other[other] [OTHER] Mother noted in the HPI. Physical Exam:    /73 mmHg  Pulse 91  Temp(Src) 97.1 °F (36.2 °C) (Temporal)  Resp 18  Ht 5' 8\" (1.727 m)  Wt 160 lb (72.576 kg)  BMI 24.33 kg/m2  SpO2 96%  General: No acute distress. HEENT: Normocephalic atraumatic.  Moist mu ceftriazone  2. Follow up urine culture  4. Chronic atrial fibrillation  5. Non obstructive coronary artery disease  6. History of GI bleeding  7. History of stroke with residual expression aphasia and left sided hemiparesis  8. Dementia  9.  Hyperlipidemia

## 2017-04-29 NOTE — PROGRESS NOTES
[facility]  Cardiology Progress Note    Bud Forrester Patient Status:  Inpatient    1927 MRN [id number]   [facility] 6NE-A Attending Imani Reid MD   Hosp Day # 1 PCP Yoselin Milan MD       Subjective: Expressive aphas Comment:Tremor  Bactrim                     Comment:Goofy    Medications:    Current Facility-Administered Medications:  potassium chloride IVPB premix 20 mEq 20 mEq Intravenous Once   furosemide (LASIX) injection 40 mg 40 mg Intravenous BID (Diuretic)   m

## 2017-04-29 NOTE — PLAN OF CARE
Pt maintained on heparin infusion, dose adjusted per protocol. Next ptt due after 2000. dvt present in left leg per doppler. afib per tele, no further evidence of vaginal bleeding, no bleeding per rectum. Vss. Continue to monitor in CNICU.

## 2017-04-29 NOTE — SLP NOTE
ADULT SWALLOWING EVALUATION    ASSESSMENT & PLAN   ASSESSMENT  Pt awake and in bed. Able to follow 1/4 commands. Unable to answer questions. RN reports that pt holds food and drink before swallowing and the last night she was unable to swallow at all. embolism ([facility] [address]    • Expressive aphasia        Prior Living Situation: Home with support  Diet Prior to Admission: Mechanical soft chopped; Thin liquids  Precautions: Aspiration    Patient/Family Goals: Unable to state    SWALLOWING HISTORY  Current Diet Con Goal #8      FOLLOW UP  Treatment Plan: Dysphagia therapy  Number of Visits to Meet Established Goals: 3  Follow Up Needed: Yes  SLP Follow-up Date: 04/30/17    Thank you for your referral.   If you have any questions, please contact Alexandria Pablo

## 2017-04-30 PROBLEM — Z99.89 OSA ON CPAP: Status: ACTIVE | Noted: 2017-01-01

## 2017-04-30 PROBLEM — G47.33 OSA ON CPAP: Status: ACTIVE | Noted: 2017-01-01

## 2017-04-30 NOTE — OCCUPATIONAL THERAPY NOTE
Attempted to see pt this AM, pt with current change in medical status per RN and not approp for therapy, OT will follow up tomorrow.

## 2017-04-30 NOTE — PHYSICAL THERAPY NOTE
Attempted to see pt this AM, pt with current change in medical status per RN and not approp for therapy, PT  will follow up tomorrow. CM

## 2017-04-30 NOTE — PLAN OF CARE
Pt seen by Dr Harman Cleary and Dr Jamila Santana, orders to follow, pt made npo, oxygen maintained to sustain saturation > 92%.

## 2017-04-30 NOTE — PLAN OF CARE
Pt care assumed this am, awake alert nods head appropriately. Meal orderedm assisting with meal, pt began to dry heave and attempt to vomit,, meal stopped pt turned to side, pt medicated with zofrgeorge Cho pagenina.  Pt with periods of desaturation dropped

## 2017-04-30 NOTE — RESPIRATORY THERAPY NOTE
JUDAH : EQUIPMENT USE: DAILY SUMMARY                                            SET MODE: AUTO CPAP WITH CFLEX                                          USAGE IN HOURS: 2:36                                          90

## 2017-04-30 NOTE — PROGRESS NOTES
[facility]  Cardiology Progress Note    [facility]:  [facility]    1927 MRN [id number]   [facility] 6NE-A Attending Lizeth Christianson MD   Hosp Day # 2 PCP Lalito Moreau MD       Subjective: Expressive aphas 0427   ALT  24  24   AST  29  25   ALB  3.2*  3.0*       Recent Labs   Lab  04/28/17   1352   TROP  2.080*       Allergies:      Avelox [Moxifloxaci*        Comment:Tremor  Bactrim                     Comment:Goofy    Medications:    Current Facility-Admini hypokinesis of the apical anterior, basal-mid inferoseptal, and apical septal myocardium. 3.  Mitral valve: Mitral valve demonstrates mildly thickened leaflets. There was moderate regurgitation. 4.  Left atrium: The left atrium was moderately dilated.   5 MD  4/30/2017  9:34 AM

## 2017-04-30 NOTE — PLAN OF CARE
Pt had 10 heart beats  Per telemetry irregular rate >150 bundle switch that alarmed as vtach. Pt was resting in bed and was just checked 1 minute prior to alarm. Dr Sharron Yin was notified and strip placed on chart.  Basic metabolic ordered for 9929 and informed t

## 2017-04-30 NOTE — PLAN OF CARE
Pt care assumed this am. Pt was awake for breakfast, verbal with incomprehensible speech per usual. After  A few minutes into the meal pt began to dry heave and belch. No emesis but then proceeded to have fluctuating desaturations down to as low as 77%.  Pt

## 2017-04-30 NOTE — PROGRESS NOTES
[facility]  Progress Note     Lacinda Fly Patient Status:  Inpatient    1927 MRN [id number]   [facility] 6NE-A Attending Lexie Sanches MD   Hosp Day # 2 PCP Destini Garcias MD     Chief Complaint: hypoxia overnight Daily    Or   • [START ON 5/1/2017] aspirin  300 mg Rectal Daily   • meropenem  500 mg Intravenous Q8H   • metoprolol tartrate  12.5 mg Oral 2x Daily(Beta Blocker)   • Losartan Potassium  100 mg Oral Daily   • Warfarin Sodium  4 mg Oral Nightly   • Pantopr

## 2017-05-01 NOTE — OCCUPATIONAL THERAPY NOTE
OT order received. Attempted to see pt for OT eval, but pt had neuro event last night. Activity clarification from neuro is required prior to pt participating in mobility and OT eval.  Spoke with RN about needing activity orders.   Will try again later as

## 2017-05-01 NOTE — PHYSICAL THERAPY NOTE
PT order received.  Attempted to see pt for PT eval, but pt had neuro event last night.  Activity clarification from neuro is required prior to pt participating in mobility and PT eval.  Spoke with RN about needing activity orders.  Will try again later as

## 2017-05-01 NOTE — PROGRESS NOTES
[facility]  Progress Note     Tiffanie Hicks Patient Status:  Inpatient    1927 MRN [id number]   [facility] 6NE-A Attending Attila Silveira MD   Hosp Day # 3 PCP Mercedez Bush MD     Chief Complaint: AMS yesterday aftern Lab  04/28/17   1352   TROP  2.080*            Imaging: Imaging data reviewed in Epic.     Medications:   • levETIRAcetam  500 mg Intravenous Q12H   • piperacillin-tazobactam  3.375 g Intravenous Q8H   • furosemide  40 mg Intravenous TID   • aspirin EC  3 status: full code, confirmed with   · Ralph: none    Plan of care discussed with patient, her . Palliative care team has met with the family. Plan to discuss further with patient son.         Nancy Bonilla MD  [facility]

## 2017-05-01 NOTE — SLP NOTE
ADULT SWALLOWING EVALUATION    ASSESSMENT & PLAN   ASSESSMENT  Patient was seen for a clinical swallow evaluation per protocol. Patient was alert, cooperative and unable to follow directions despite maximal verbal and tactile cues.  Patient presented with a PRESSURE    • Migraines      2 per year   • Unspecified essential hypertension    • Visual impairment    • OTHER DISEASES    • CONGESTIVE HEART FAILURE    • Esophageal reflux    • Dementia    • Depression    • Arrhythmia      irregular heart rate   • Strok nondominant left vertebral artery.     9.  Nonspecific asymmetric prominence of the right base of the tongue with direct inspection is suggested.     Preliminary critical results in regards to the noncontrast CT of the head was discussed with Dr. Lenka Carvajal at Impaired  Bolus Retrieval: Impaired  Bilabial Seal: Intact  Bolus Formation: Impaired  Bolus Propulsion: Impaired  Mastication: Impaired (no functional mastication with solid)       Pharyngeal Phase of Swallow: Impaired  Laryngeal Elevation Timing: Impaire

## 2017-05-01 NOTE — PLAN OF CARE
Assumed care of the patient at 0730, seizure percautions in place, neuro Q4, no change; remains with speech aphasia, left arm contracted, can follow some commands. See neuro flowsheet. MRI cancelled per Dr. Fabiano Lobo.  VSS,afebrile, speech to bedside, see die

## 2017-05-01 NOTE — PLAN OF CARE
Pt acting physically aggressive at times, grapping at nurses shirt;  at bedside stating she does this at home with the caregiver. No seizure like activity noted or staring off in the distance as reported with aggressive behavior last night.   Carlos

## 2017-05-01 NOTE — CONSULTS
[address]  [id number]  Hospital Day #3  Date of Consult: 05/01/17       Reason for Consultation: Consult requested for evaluation of palliative care needs and goals of care discussion. occasionally aggressive with caregivers at home. He feels at this point pt is at her baseline. He could not say whether she had previous seizure like episodes. Pt completed HCPOA naming her spouse, but no other advance directives.   Addressed code status coordinating care. Of the total time 20 minutes were spent discussing advanced care planning.

## 2017-05-01 NOTE — PLAN OF CARE
Impaired Swallowing    • Minimize aspiration risk Not Progressing          NEUROLOGICAL - ADULT    • Achieves stable or improved neurological status Not Progressing    • Absence of seizures Not Progressing    • Remains free of injury related to seizure act

## 2017-05-01 NOTE — CONSULTS
[facility]  Neurocritical Care       Name: Tiffanie Hicks  MRN: [id number]  Admission Date/Time: 4/28/2017  1:34 PM  Primary Care Provider:  Mercedez Bush MD        Reason for Consultation:   Seizure like activity    HPI:   [place]on[place] 11/09/2016      Acute heart failure, unspecified heart failure type [facility])         Date Noted: 11/09/2016      Atrial fibrillation and flutter [facility])         Date Noted: 11/09/2016      Community acquired pneumonia         Date Noted: 11/09/2016      Other p 4/28/2017 at 0900   LORazepam 0.5 MG Oral Tab Take 0.5 mg by mouth 2 (two) times daily. Disp:  Rfl:  4/28/2017 at 0900   mirtazapine 15 MG Oral Tab Take 15 mg by mouth nightly.  Disp:  Rfl:  4/27/2017 at 2100   furosemide 40 MG Oral Tab Take 1 tablet (40 mg Number of children:               Social History Main Topics    Smoking Status: Former Smoker                   Packs/Day: 0.25  Years: 48        Quit date: 12/16/1963    Smokeless Status: Never Used                        Alcohol Use: No              Drug mg 650 mg Oral Q6H PRN   PEG 3350 (MIRALAX) powder packet 17 g 17 g Oral Daily PRN   ondansetron HCl (ZOFRAN) injection 4 mg 4 mg Intravenous Q6H PRN         Review of Systems    Constitutional:    Denies unusual weight loss or weight gain, fever/chills or INDICATIONS:  CARMEN  TECHNIQUE:  IV contrast-enhanced multislice CT angiography is performed through the pulmonary arterial anatomy. 3D volume renderings are generated. Dose reduction techniques were used.  Dose information is transmitted to the ACR ([place] atelectasis in the lower lobes. 3. Patchy groundglass opacity in the right upper lobe may represent mild pulmonary edema. Critical findings were called to Dr. Greg Paulino at 4:50 PM on 4/28/17 with read back. Dictated by:  Rudy Abbott MD on 4/28/2017 at 16:47 Technologist)  Pt. desating. 4/30/2017  CONCLUSION:  Right basilar airspace disease with bilateral pleural effusions areas no significant change. Mild cardiomegaly with pulmonary venous congestion . No pneumothorax.     Dictated by: Hilda Bills HISTORY:(As transcribed by Technologist)  Non - verbal , left sideded weakness and aggressive. CONTRAST USED:    FINDINGS:  Stable moderate global brain parenchymal volume loss without overt hydrocephalus. .  There is no midline shift or mass-effect.   The b posterior cerebral arteries. The branches of the superior cerebellar arteries are unremarkable. There is mild atherosclerotic narrowing of the mid basilar artery at the takeoffs of the bilateral AICA. The right vertebral artery is dominant.  There is mild c the right middle cerebral artery. There is mild atherosclerotic irregularity of the M1 and M2 segments of the left middle cerebral artery.   4. There is mild atherosclerotic irregularity and narrowing in the bilateral P1 and P2 segments of the posterior cer 148*  147*   K  4.4  3.7  4.4  3.2*  3.6   CL  115*  113*  116*  109  111   CO2  23.0  25.0  28.0  31.0  28.0   ALKPHO  68  63   --    --    --    AST  29  25   --    --    --    ALT  24  24   --    --    --    BILT  0.6  0.6   --    --    --    TP  6.6 Hospitalist.  Endocrine:  - Hyperglycemia protocol   Skin:  - Monitor for skin breakdown.   DVT Prophylaxis:  - SCD’s  - Heparin drip for PE    ABCDEF:  A: Pain score 0/10, Meds as above  B: O2 via NC  C: RASS 0 to +1  D: CAM ICU +, now she is calm though

## 2017-05-01 NOTE — PROGRESS NOTES
[facility]  Neurocritical Care APN Progress Note    NAME: Priyanka Valencia - ROOM: [id number] - MRN: [id number] - Age: 80year old - :1927    History Of Present Illness:  Priyanka Valencia is a 80year old female with PMHx sign Use: No              OBJECTIVE  Vitals:  /67 mmHg  Pulse 76  Temp(Src) 98.7 °F (37.1 °C) (Temporal)  Resp 25  Ht 172.7 cm (5' 8\")  Wt 154 lb 5.2 oz (70 kg)  BMI 23.47 kg/m2  SpO2 99%    Physical Exam:    General Appearance: Sleeping, awakens with pa USED:  61 cc of Omnipaque 350  FINDINGS:  VASCULATURE:  There is a moderate to large burden of pulmonary embolus with partially occlusive embolus involving the right main pulmonary embolus artery with extension of clot into the interlobar descending pulmon TECHNIQUE:  Real time, grey scale, and duplex ultrasound was used to evaluate the lower extremity venous system. B-mode two-dimensional images of the vascular structures, Doppler spectral analysis, and color flow. Doppler imaging were performed.   The foll XR CHEST PA + LAT CHEST (CPT=71020), 3/28/2017, 10:11. INDICATIONS:  CARMEN  PATIENT STATED HISTORY: (As transcribed by Technologist)  Patient offered no additional history at this time.     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 New age-indeterminate infarct in the superior right cerebellar hemisphere measuring up to 1.1 x 0.7 cm. There is no evident fracture.  Bilateral intraocular l not entirely excluded. There is a 3-vessel aortic arch with mild mixed plaque. There is mild-to-moderate scattered mixed plaque along the subclavian arteries without hemodynamically significant narrowing.  There is minimal noncalcified plaque in the innom left V4 segment is not entirely excluded. 7. No evidence of occlusion, dissection, or flow-limiting stenosis in the cervical vertebral or carotid arteries. No evidence of hemodynamically significant carotid stenosis by NASCET criteria.   8. Mild atheroscle

## 2017-05-01 NOTE — PROCEDURES
ELECTROENCEPHALOGRAM REPORT      Patient Name: Priyanka Valencia  Chart ID: [id number]  Ordering Physician: Donnald Brunner                    Date of Test: 5/1/2017    A routine EEG was obtained. No sedation was given.     DX:AMS  HX:88 Y/O FEMALE PRESENTS WITH Vascular Neurology(Stroke), Neurocritical Care and Headache Medicine.       R#[id number]

## 2017-05-01 NOTE — PLAN OF CARE
Pt was last seen on rounds at 1710, went into room to reposition at 1810 and awoke patient, she awoke and became aggressive swinging and kicking at staff. Pt was previously expressively aphasic and would attempt to communicate now was nonverbal/mute. Maldonado

## 2017-05-01 NOTE — PALLIATIVE CARE NOTE
[facility] Work Follow Up  PCSW notified by Maria Ines that the d/c plan will be fore pt to return home with  at 25/ caregiver; Maria Ines currently waiting phone call back from pts son regarding Palliative Care a

## 2017-05-01 NOTE — PROGRESS NOTES
[facility]  Progress Note    Jennifer Forrester Patient Status:  Inpatient    1927 MRN [id number]   [facility] 6NE-A Attending Yuni King MD   Hosp Day # 3 PCP Ailyn Palacio MD       Subjective:  Aphasic but appears comfor breakfast   • atorvastatin  40 mg Oral Nightly   • FLUoxetine HCl  20 mg Oral Daily     • Dextrose-NaCl     • Continuous dose Heparin infusion 600 Units/hr (05/01/17 0800)       Assessment:    · Submassive PE, elevated trop, mild to mod rv dysfunction- not

## 2017-05-01 NOTE — PROGRESS NOTES
05/01/17 1541   Clinical Encounter Type   Visited With Patient  Davida Genao visited patient, offering prayer, Scripture, support and SOS)   Routine Visit Introduction   Continue Visiting No

## 2017-05-01 NOTE — RESPIRATORY THERAPY NOTE
JUDAH - Equipment Use Daily Summary:  · Set Mode AFLEX  · Usage in hours: 2:35  · 90% Pressure (EPAP) level:   · 90% Insp Pressure (IPAP): 11  · AHI: 18  · Supplemental Oxygen:6  · Comments:

## 2017-05-02 NOTE — PROGRESS NOTES
[facility]  Progress Note    Agustin Forrester Patient Status:  Inpatient    1927 MRN [id number]   [facility] 6NE-A Attending Lynn Quintero MD   Hosp Day # 4 PCP Lorena Villatoro MD       Subjective:  Aphasic.  Does not appear u • Losartan Potassium  100 mg Oral Daily   • Warfarin Sodium  4 mg Oral Nightly   • Pantoprazole Sodium  20 mg Oral QAM AC   • Levothyroxine Sodium  100 mcg Oral Before breakfast   • atorvastatin  40 mg Oral Nightly   • FLUoxetine HCl  20 mg Oral Daily

## 2017-05-02 NOTE — PLAN OF CARE
Received pt at 0730, pt in bed, no neuro changes noted. VSS, remains in afib; PT/OT to bedside, pt remains incontinent, with scant blood from vaginal area. Not new. Nasal cannula on, but pt continues to remove from nose, sats WNL.   Takes PO thickend, pure

## 2017-05-02 NOTE — PLAN OF CARE
Assumed care 1000. Patient alert to self, expressive aphasia. Drowsy at times. Vitals stable, Afib per tele. Incontinent of bladder, no BM,  turned q 2. Heparin infusing per protocol.  at bedside. Will continue to monitor.        CARDIOVASCULAR - AD

## 2017-05-02 NOTE — RESPIRATORY THERAPY NOTE
JUDAH Equipment Usage Summary :            Set Mode :AFLEX          Usage in Hours:7;36          90% Pressure (EPAP) : 10.5           90% Insp Pressure (IPAP);           AHI : 8.2          Supplemental Oxygen :  4    LPM

## 2017-05-02 NOTE — PLAN OF CARE
Assumed care at [facility], patient resting in bed, opens eyes spontaneously, does not answer appropriately or follow commands. Seizure precautions in place, neuro checks q4h, neurologic status stable throughout night. VSS.  Incontinent of urine x3, small blood c

## 2017-05-02 NOTE — PALLIATIVE CARE NOTE
[address] Follow Up      Daniela Corcoran  [id number]       Patient seen and evaluated, family at bedside. Pt is awake and alert. Attempts to answer questions, but speech is incomprehensible. Smiling, appears comfortable.

## 2017-05-02 NOTE — OCCUPATIONAL THERAPY NOTE
OCCUPATIONAL THERAPY QUICK EVALUATION - INPATIENT    Room Number: 0337/8548-F  Evaluation Date: 5/2/2017     Type of Evaluation: Initial  Presenting Problem: SOB    Physician Order: IP Consult to Occupational Therapy  Reason for Therapy:  ADL/IADL Dysfunct Right  Drives: No       Prior Level of [place]: Pt was living with  and 24-hr caregiver, pt with assist for all ADLs adn is W/C bound at baseline    SUBJECTIVE  Pt did not state.     OBJECTIVE     Fall Risk: High fall risk    WEIGHT BEARING REST return to I/ADL tasks. No further OT services needed at this time.      OT Discharge Recommendations: Home  OT Device Recommendations: None (Pt has all needed at home)    PLAN  Patient has been evaluated and presents with no skilled Occupational Therapy nee

## 2017-05-02 NOTE — PALLIATIVE CARE NOTE
[facility] Work Follow Up    Discussion:  PCSW met with pt, pts  and pts son. Discussed d/c plans.  Pts  states pt has [facility] [phone] who are currently arranging a hospital bed; justen presley

## 2017-05-02 NOTE — TELEPHONE ENCOUNTER
Call to pt's spouse/ray reaches voice mail. Advised following up on refill request last wk for pain med.  Requested call back to determine if this has been addressed while in hospital.

## 2017-05-02 NOTE — PROGRESS NOTES
[facility]  Progress Note     Edmundo Posey Patient Status:  Inpatient    1927 MRN [id number]   [facility] 7NE-A Attending Esteban Colon MD   Hosp Day # 4 PCP Adalid Handy MD     Chief Complaint: expressive aphasia piperacillin-tazobactam  3.375 g Intravenous Q8H   • aspirin EC  325 mg Oral Daily   • metoprolol tartrate  12.5 mg Oral 2x Daily(Beta Blocker)   • Losartan Potassium  100 mg Oral Daily   • Pantoprazole Sodium  20 mg Oral QAM AC   • Levothyroxine Sodium  1

## 2017-05-02 NOTE — PHYSICAL THERAPY NOTE
PHYSICAL THERAPY QUICK EVALUATION - INPATIENT    Room Number: 5326/2829-S  Evaluation Date: 5/2/2017  Presenting Problem: PE  Physician Order: PT Eval and Treat    Problem List  Principal Problem:    Other acute pulmonary embolism without acute cor pulmona expressive aphasia     OBJECTIVE  Precautions: Seizure  Fall Risk: High fall risk    WEIGHT BEARING RESTRICTION  Weight Bearing Restriction: None                PAIN ASSESSMENT  Rating: Unable to rate         RANGE OF MOTION AND STRENGTH ASSESSMENT  Upper bed with MAX assist. Pt left with call light in reach. RN aware. Patient End of Session: In bed;Needs met;Call light within reach;RN aware of session/findings; All patient questions and concerns addressed;SCDs in place; Discussed recommendations with sheila

## 2017-05-03 NOTE — PROGRESS NOTES
[facility]  Cardiology Progress Note    Subjective:  No chest pain or shortness of breath. Resting quietly in bed.       Objective:  /58 mmHg  Pulse 77  Temp(Src) 98.1 °F (36.7 °C) (Oral)  Resp 18  Ht 5' 8\" (1.727 m)  Wt 162 lb 0.6 oz (73.5 kg) her first therapeutic INR. Will continue heparin infusion for now, redose Warfarin. Likely discontinue heparin infusion tomorrow. - No further hematochezia/melena. Await stool for occult blood.   - Continue daily, oral Lasix    Jose Devries, CYNTHIAN  5/3/2

## 2017-05-03 NOTE — PLAN OF CARE
Neuros q 4  Does not always follow commands  Speech is incomprehensible  RA, afib, BP low fluids started  Incontinent, kept dry and clean  Turned q2  Will continue to monitor

## 2017-05-03 NOTE — PALLIATIVE CARE NOTE
[facility] Work Follow Up    Discussion:  Roberto Rosanna [phone] called PCSW back and states that they can resume home health at d/c but do not provide PC.  Updates sent ; confirmed with Gerjose/Colin to resume servi

## 2017-05-03 NOTE — RESPIRATORY THERAPY NOTE
JUDAH Equipment Usage Summary :            Set Mode :AFLEX          Usage in Hours:6;26          90% Pressure (EPAP) : 14           90% Insp Pressure (IPAP);           AHI : 35.2          Supplemental Oxygen : 2     LPM

## 2017-05-03 NOTE — PROGRESS NOTES
[facility]  Progress Note     Adalid Suma Patient Status:  Inpatient    1927 MRN [id number]   [facility] 7NE-A Attending Pauly Cho MD   Hosp Day # 5 PCP Syed Bradford MD     Chief Complaint: expressive aphasia 500 mg Intravenous Q12H   • piperacillin-tazobactam  3.375 g Intravenous Q8H   • aspirin EC  325 mg Oral Daily   • metoprolol tartrate  12.5 mg Oral 2x Daily(Beta Blocker)   • Losartan Potassium  100 mg Oral Daily   • Pantoprazole Sodium  20 mg Oral QAM AC

## 2017-05-03 NOTE — PLAN OF CARE
Assumed care of pt at 1900. Drowsy, awakes and converses when spoken to. Speech incomprehensible. On RA, CPAP with sleep. Afib, rate controlled. Heparin gtt infusing per protocol. Incontient, small amount of blood from vaginal area. No stools over night.  Rocío Canchola

## 2017-05-03 NOTE — SLP NOTE
SPEECH DAILY NOTE - INPATIENT    Evaluation Date: 05/03/2017    ASSESSMENT & PLAN   ASSESSMENT  Pt seen for dysphagia tx to assess tolerance with recommended diet, ensure proper utilization of aspiration precautions and provide pt/family education.   Patien puree consistency and nectar thick liquids without overt signs or symptoms of aspiration with 100 % accuracy over 1-2 session(s).               FOLLOW UP  Follow Up Needed: Yes  SLP Follow-up Date: 05/04/17  Number of Visits to Meet Established Goals: 3  Se

## 2017-05-04 NOTE — PROGRESS NOTES
[facility]  Cardiology Progress Note    Subjective:  No chest pain or shortness of breath. No obvious distress.     Objective:  /76 mmHg  Pulse 78  Temp(Src) 97.6 °F (36.4 °C) (Axillary)  Resp 18  Ht 5' 8\" (1.727 m)  Wt 162 lb 0.6 oz (73.5 kg) heparin infusion  - Will enroll in [facility]. Should have PT/INR obtained Friday after discharge given concurrent antibiotics and hx GIB.     - Will review with Dr. Joe Middleton, but with addition of Coumadin, would favor lowering to [facility] ASA 81

## 2017-05-04 NOTE — PLAN OF CARE
Assumed care at 0730. Afib per tele, rate controlled. Pt has expressive aphasia, non comprehensible speech. Baseline L sided weakness/flacidity, facial droop. Pt bathed, changed as needed. Needs attended to. Plan: home today with HH/24 hour care.

## 2017-05-04 NOTE — PLAN OF CARE
NURSING DISCHARGE NOTE    Discharged Home via Ambulance. Accompanied by Support staff  Belongings Taken by patient/family. Pt spouse educated on discharge instructions multiple times to ensure understanding. Pt taken by ambulance.

## 2017-05-04 NOTE — PALLIATIVE CARE NOTE
[facility] Work Follow Up    Discussion:  Pt d/c today at 6pm to home with [facility] RN/PT/OT/Speech Therapy; PCSW contacted Gladys/Colin who confirmed RN/PT/OT/Speech will be seeing pt is home and he will call pts

## 2017-05-04 NOTE — PLAN OF CARE
Pt AOx1. Aggitated. Haldol IV PRN. RA.  . Afib. Heparin infusing at 6cc/hr.  0.9NS infusing at 100cc/hr. Seizure Protocol. Neuro Q4. Will continue to monitor.

## 2017-05-04 NOTE — PROGRESS NOTES
[facility]  Progress Note     Sara You Patient Status:  Inpatient    1927 MRN [id number]   [facility] 7NE-A Attending Mingo Mcgee MD   Hosp Day # 6 PCP Mian Shay MD     Chief Complaint: expressive aphasia 3.375 g Intravenous Q8H   • aspirin EC  325 mg Oral Daily   • metoprolol tartrate  12.5 mg Oral 2x Daily(Beta Blocker)   • Losartan Potassium  100 mg Oral Daily   • Pantoprazole Sodium  20 mg Oral QAM AC   • Levothyroxine Sodium  100 mcg Oral Before breakf

## 2017-05-04 NOTE — SLP NOTE
SPEECH DAILY NOTE - INPATIENT    Evaluation Date: 05/04/2017    ASSESSMENT & PLAN   ASSESSMENT  Pt seen for dysphagia tx to assess tolerance with recommended diet, ensure proper utilization of aspiration precautions and provide pt/family education.   Patien signs or symptoms of aspiration with 100 % accuracy over 1-2 session(s).                      FOLLOW UP  Follow Up Needed: No  SLP Follow-up Date: 05/04/17  Number of Visits to Meet Established Goals: 3  Session: 3 of 3    If you have any questions, please

## 2017-05-04 NOTE — RESPIRATORY THERAPY NOTE
JUDAH - Equipment Use Daily Summary:                  . Set Mode:                . Usage in hours:                . 90% Pressure (EPAP) level:                . 90% Insp. Pressure (IPAP): [place] August AHI:                .  Supplemental Oxygen:

## 2017-05-04 NOTE — CM/SW NOTE
Pt is ready for d/c today. PCSW following.   Arranged [facility] to  pt and transport her home today at 6:00pm.

## 2017-05-05 NOTE — TELEPHONE ENCOUNTER
Michelle Fofana from [facility] pt was disch 5/4/17 from [facility] with orders for PT,INR draw for 5/8/17.  Michelle Fofana states old orders from you on 4/12/17 ask for a recheck of CMP in 3 weeks but it was done during hospital and is that ok or do you want to recheck

## 2017-05-05 NOTE — TELEPHONE ENCOUNTER
Patient discharged home from [facility] on 5/4/17, NCM attempted to schedule TCM HFU, patient's  Pam Cobian states he will call to schedule the appointment. Triage: pt declined scheduling a TCM/HFU appointment at this time.  Please call patient

## 2017-05-05 NOTE — TELEPHONE ENCOUNTER
Lm for Parker Howard to call back,spoke with  and he instructed that as long as CMP was normal in the hospital, it does not need to be repeated.

## 2017-05-05 NOTE — CM/SW NOTE
05/05/17 0900   Discharge disposition   Discharged to: Home-Health   Name of Facillity/Home Care/Hospice ([facility])   Additional Home Care/Hospice Provider [facility] services after discharge Employed caregiver   Discharge transportation [address]

## 2017-05-05 NOTE — DISCHARGE SUMMARY
[facility] [place] [facility]  DISCHARGE SUMMARY     Jennifer Forrester Patient Status:  Inpatient    1927 MRN [id number]   [facility] 7NE-A Attending No att. providers found   Hosp Day # 6 PCP Ailyn Palacio MD     Date of Admission:  is able to say yes or not to some questions and denies recent chest pain. Patient's  has not noted any cough, LE edema, fevers. She is chronically incontinent of urine, no changes.  Patient was previously anticoagulated with warfarin for Afib but her COUMADIN        Take 1 tablet (3 mg total) by mouth nightly.     Quantity:  30 tablet   Refills:  3         CHANGE how you take these medications       Instructions Prescription details    ergocalciferol 66354 units Caps   Commonly known as:  DRISDOL/VITAMI Commonly known as:  ATIVAN        Take 0.5 mg by mouth 2 (two) times daily.     Refills:  0       losartan 100 MG Tabs   Last time this was given:  100 mg on 5/4/2017  8:29 AM   Commonly known as:  COZAAR        Take 1 tablet (100 mg total) by mouth daily [facility] [address] [id number]  [phone]    Schedule an appointment as soon as possible for a visit in 2 weeks  or Nurse Practioner 2-4 weeks    PT/INR    On 5/5/2017  results to Jonathon Navarrete [phone]    -------------------

## 2017-05-05 NOTE — PROGRESS NOTES
Initial Post Discharge Follow Up   Discharge Date: 5/4/17  Contact Date: 5/5/2017    Consent Verification:  Assessment Completed With: Spouse: Gordon Nava received per patient?  written  HIPAA Verified?   Yes    Discharge Dx:   Acute encephalopath 0.5 mg by mouth 2 (two) times daily. Disp:  Rfl:    mirtazapine 15 MG Oral Tab Take 15 mg by mouth nightly. Disp:  Rfl:    furosemide 40 MG Oral Tab Take 1 tablet (40 mg total) by mouth daily.  Dose adjustment per md Disp: 90 tablet Rfl: 1   metoprolol Tart OT, Speech, Other: PT           Needs post D/C:   Now that you are home, are there any needs or concerns you need addressed before your next visit with your PCP?  (DME, meds, disease concerns, Etc): No     Follow up appointments:       Your appointments

## 2017-05-08 ENCOUNTER — TELEPHONE (OUTPATIENT)
Dept: FAMILY MEDICINE CLINIC | Facility: CLINIC | Age: 82
End: 2017-05-08

## 2017-05-08 NOTE — TELEPHONE ENCOUNTER
Alethea Linton from [facility] called to let Dr. Nanci Devine know pt passed away on Saturday, May 6. Alethea Linton will be faxing over death certificate.

## 2019-03-01 VITALS
HEIGHT: 64 IN | SYSTOLIC BLOOD PRESSURE: 120 MMHG | BODY MASS INDEX: 27.31 KG/M2 | HEART RATE: 68 BPM | DIASTOLIC BLOOD PRESSURE: 80 MMHG | WEIGHT: 160 LBS

## (undated) NOTE — MR AVS SNAPSHOT
[facility] [address], [address] [address] [phone]               Thank you for choosing us for your health care visit with Mian Shay MD.  We are glad to serve you and happy to provide you with this adames ARTIFICIAL TEARS 0.4 % Soln   Generic drug:  Hypromellose   Place 1 drop into both eyes TID CC and HS.           aspirin 325 MG Tabs   Take 1 tablet by mouth daily.            Atorvastatin Calcium 40 MG Tabs   TAKE 1 TABLET NIGHTLY   Commonly known as:  LI Take 20 mEq by mouth 2 (two) times daily. Commonly known as:  K-DUR M20           * Notice: This list has 2 medication(s) that are the same as other medications prescribed for you.  Read the directions carefully, and ask your doctor or other care provide Start activities slowly and build up over time Do what you like   Get your heart pumping – brisk walking, biking, swimming Even 10 minute increments are effective and add up over the week   2 ½ hours per week – spread out over time Use a carmen to keep you

## (undated) NOTE — IP AVS SNAPSHOT
[facility] [place] [address] [place], [address] ~ [phone]                Discharge Summary   4/28/2017    [facility]           Admission Information        Provider Department    4/28/2017 David Hannon MD  7ne-A Morning Afternoon Evening As Needed    ergocalciferol 64708 units Caps   Commonly known as:  DRISDOL/VITAMIN D2   What changed:  See the new instructions. Next dose due: Take as before.         TAKE 1 CAPSULE ONCE A WEEK (EVERY FRIDAY)    Ghada Patch Commonly known as:  ATIVAN        Take 0.5 mg by mouth 2 (two) times daily.                                losartan 100 MG Tabs   Last time this was given:  100 mg on 5/4/2017  8:29 AM   Commonly known as:  COZAAR        Take 1 tablet (100 mg total) by mout Satish/Stephanie [phone]    Take medications as prescribed. Attend follow up appointments as scheduled. Return to ED should signs or symptoms reappear or worsen.      Discharge References/Attachments     DEEP VEIN THROMBOSIS, DISCHARGE INSTRUCTIONS FOR (E (05/03/17)  5.3 (05/03/17)  3.85 (05/03/17)  12.8 (05/03/17)  39.7 (05/03/17)  103.1 (H) -- -- -- (05/03/17)  201.0 --    (05/02/17)  6.8 (05/02/17)  3.68 (L) (05/02/17)  12.1 (05/02/17)  38.4 (05/02/17)  104.3 (H)    (05/02/17)  192.0     (05/01/17)  8.3 - If you are a smoker or have smoked in the last 12 months, we encourage you to explore options for quitting.     - If you have concerns related to behavioral health issues or thoughts of harming yourself, contact [facility] a your medications while at home.          Blood Pressure and Cardiac Medications     metoprolol Tartrate 25 MG Oral Tab    Losartan Potassium 100 MG Oral Tab         Use: Treat abnormal blood pressure (high or low), cardiac conditions; and/or abnormal heart OMEPRAZOLE 20 MG Oral Capsule Delayed Release    Alum & Mag Hydroxide-Simeth 200-200-20 MG/5ML Oral Suspension    Calcium Carbonate Antacid 600 MG Oral Chew Tab       Use:  Nausea/vomiting, acid reflux, low bowel motility, stomach pain   Most common side Hypromellose (ARTIFICIAL TEARS) 0.4 % Ophthalmic Solution    Potassium Chloride ER 20 MEQ Oral Tab CR    Glucosamine Sulfate 1000 MG Oral Cap

## (undated) NOTE — MR AVS SNAPSHOT
[facility] [address], [address] [id number] [phone]               Thank you for choosing us for your health care visit with Anum rAaujo MD.  We are glad to serve you and happy to provide you with this adames Take by mouth 4 (four) times daily as needed for Indigestion.    Commonly known as:  MAALOX           ARTIFICIAL TEARS 0.4 % Soln   Generic drug:  Hypromellose   Place 1 drop into both eyes TID CC and HS.           aspirin 325 MG Tabs   Take 1 tablet by ani Take 20 mg by mouth every morning before breakfast.   Commonly known as:  PRILOSEC           Potassium Chloride ER 20 MEQ Tbcr   Take 20 mEq by mouth 2 (two) times daily. Commonly known as:  K-DUR M20           * Notice:   This list has 2 medication(s) th

## (undated) NOTE — Clinical Note
FYI, TCM call made, see notes. NCM attempted to schedule TCM HFU, patient's  states he will schedule the appointment. Message sent to MD's office.

## (undated) NOTE — MR AVS SNAPSHOT
[facility] [address], [address] [address] [phone]               Thank you for choosing us for your health care visit with Deanna Mckeon MD.  We are glad to serve you and happy to provide you with this adames aspirin 325 MG Tabs   Take 1 tablet by mouth daily. Atorvastatin Calcium 40 MG Tabs   TAKE 1 TABLET NIGHTLY   Commonly known as:  LIPITOR           Calcium Carbonate Antacid 600 MG Chew   Chew 600 mg by mouth daily.            ergocalciferol 5000 Potassium Chloride ER 20 MEQ Tbcr   Take 20 mEq by mouth 2 (two) times daily. What changed:  Another medication with the same name was removed. Continue taking this medication, and follow the directions you see here.    Commonly known as:  K-DUR M20 ? Keep furniture in its accustomed place. ? If you have pets, be careful that you don’t trip over them. OUTSIDE SAFETY TIPS  ? Always wear good shoes with proper support and traction. ? Always use hand rails on stairs and escalators.   ? Cover porch s